# Patient Record
(demographics unavailable — no encounter records)

---

## 2024-12-03 NOTE — HISTORY OF PRESENT ILLNESS
[de-identified] : TEMP THIS MORNING 100.7 RECTALLY [FreeTextEntry6] : low grade fevers last night but had fever today of 100.7 rectally with fussiness.  taking 1 oz every 2 hours. good UOP not vaccinated.